# Patient Record
Sex: FEMALE | Race: OTHER | HISPANIC OR LATINO | Employment: OTHER | URBAN - METROPOLITAN AREA
[De-identification: names, ages, dates, MRNs, and addresses within clinical notes are randomized per-mention and may not be internally consistent; named-entity substitution may affect disease eponyms.]

---

## 2020-10-06 ENCOUNTER — HOSPITAL ENCOUNTER (EMERGENCY)
Facility: HOSPITAL | Age: 70
Discharge: HOME | End: 2020-10-06
Attending: EMERGENCY MEDICINE
Payer: MEDICARE

## 2020-10-06 VITALS
DIASTOLIC BLOOD PRESSURE: 74 MMHG | RESPIRATION RATE: 22 BRPM | SYSTOLIC BLOOD PRESSURE: 145 MMHG | HEART RATE: 83 BPM | BODY MASS INDEX: 28.52 KG/M2 | WEIGHT: 155 LBS | HEIGHT: 62 IN | TEMPERATURE: 98 F | OXYGEN SATURATION: 100 %

## 2020-10-06 DIAGNOSIS — E86.0 DEHYDRATION: ICD-10-CM

## 2020-10-06 DIAGNOSIS — E11.65 UNCONTROLLED TYPE 2 DIABETES MELLITUS WITH HYPERGLYCEMIA: Primary | ICD-10-CM

## 2020-10-06 DIAGNOSIS — R42 DIZZINESS: ICD-10-CM

## 2020-10-06 LAB
ALBUMIN SERPL BCP-MCNC: 3.8 G/DL (ref 3.5–5.2)
ALP SERPL-CCNC: 121 U/L (ref 55–135)
ALT SERPL W/O P-5'-P-CCNC: 19 U/L (ref 10–44)
ANION GAP SERPL CALC-SCNC: 12 MMOL/L (ref 8–16)
AST SERPL-CCNC: 16 U/L (ref 10–40)
BACTERIA #/AREA URNS HPF: ABNORMAL /HPF
BASOPHILS # BLD AUTO: 0.04 K/UL (ref 0–0.2)
BASOPHILS NFR BLD: 0.6 % (ref 0–1.9)
BILIRUB SERPL-MCNC: 0.4 MG/DL (ref 0.1–1)
BILIRUB UR QL STRIP: NEGATIVE
BUN SERPL-MCNC: 15 MG/DL (ref 8–23)
CALCIUM SERPL-MCNC: 8.5 MG/DL (ref 8.7–10.5)
CHLORIDE SERPL-SCNC: 98 MMOL/L (ref 95–110)
CLARITY UR: CLEAR
CO2 SERPL-SCNC: 25 MMOL/L (ref 23–29)
COLOR UR: ABNORMAL
CREAT SERPL-MCNC: 1.1 MG/DL (ref 0.5–1.4)
DIFFERENTIAL METHOD: ABNORMAL
EOSINOPHIL # BLD AUTO: 0.2 K/UL (ref 0–0.5)
EOSINOPHIL NFR BLD: 2.2 % (ref 0–8)
ERYTHROCYTE [DISTWIDTH] IN BLOOD BY AUTOMATED COUNT: 15.1 % (ref 11.5–14.5)
EST. GFR  (AFRICAN AMERICAN): 58 ML/MIN/1.73 M^2
EST. GFR  (NON AFRICAN AMERICAN): 50 ML/MIN/1.73 M^2
GLUCOSE SERPL-MCNC: 572 MG/DL (ref 70–110)
GLUCOSE UR QL STRIP: ABNORMAL
HCT VFR BLD AUTO: 38.8 % (ref 37–48.5)
HGB BLD-MCNC: 12.6 G/DL (ref 12–16)
HGB UR QL STRIP: ABNORMAL
HYALINE CASTS #/AREA URNS LPF: 0 /LPF
IMM GRANULOCYTES # BLD AUTO: 0.03 K/UL (ref 0–0.04)
IMM GRANULOCYTES NFR BLD AUTO: 0.4 % (ref 0–0.5)
KETONES UR QL STRIP: ABNORMAL
LEUKOCYTE ESTERASE UR QL STRIP: NEGATIVE
LYMPHOCYTES # BLD AUTO: 1.7 K/UL (ref 1–4.8)
LYMPHOCYTES NFR BLD: 22.8 % (ref 18–48)
MAGNESIUM SERPL-MCNC: 1.9 MG/DL (ref 1.6–2.6)
MCH RBC QN AUTO: 25.9 PG (ref 27–31)
MCHC RBC AUTO-ENTMCNC: 32.5 G/DL (ref 32–36)
MCV RBC AUTO: 80 FL (ref 82–98)
MICROSCOPIC COMMENT: ABNORMAL
MONOCYTES # BLD AUTO: 0.8 K/UL (ref 0.3–1)
MONOCYTES NFR BLD: 10.6 % (ref 4–15)
NEUTROPHILS # BLD AUTO: 4.6 K/UL (ref 1.8–7.7)
NEUTROPHILS NFR BLD: 63.4 % (ref 38–73)
NITRITE UR QL STRIP: NEGATIVE
NRBC BLD-RTO: 0 /100 WBC
PH UR STRIP: 6 [PH] (ref 5–8)
PLATELET # BLD AUTO: 222 K/UL (ref 150–350)
PMV BLD AUTO: 11.1 FL (ref 9.2–12.9)
POCT GLUCOSE: 284 MG/DL (ref 70–110)
POCT GLUCOSE: 376 MG/DL (ref 70–110)
POTASSIUM SERPL-SCNC: 3.7 MMOL/L (ref 3.5–5.1)
PROT SERPL-MCNC: 7 G/DL (ref 6–8.4)
PROT UR QL STRIP: ABNORMAL
RBC # BLD AUTO: 4.86 M/UL (ref 4–5.4)
RBC #/AREA URNS HPF: 5 /HPF (ref 0–4)
SODIUM SERPL-SCNC: 135 MMOL/L (ref 136–145)
SP GR UR STRIP: >1.03 (ref 1–1.03)
TROPONIN I SERPL DL<=0.01 NG/ML-MCNC: <0.006 NG/ML (ref 0–0.03)
TSH SERPL DL<=0.005 MIU/L-ACNC: 3.58 UIU/ML (ref 0.4–4)
URN SPEC COLLECT METH UR: ABNORMAL
UROBILINOGEN UR STRIP-ACNC: NEGATIVE EU/DL
WBC # BLD AUTO: 7.27 K/UL (ref 3.9–12.7)
WBC #/AREA URNS HPF: 5 /HPF (ref 0–5)
YEAST URNS QL MICRO: ABNORMAL

## 2020-10-06 PROCEDURE — 82962 GLUCOSE BLOOD TEST: CPT | Mod: 91

## 2020-10-06 PROCEDURE — 85025 COMPLETE CBC W/AUTO DIFF WBC: CPT

## 2020-10-06 PROCEDURE — 25000003 PHARM REV CODE 250: Performed by: EMERGENCY MEDICINE

## 2020-10-06 PROCEDURE — 93010 ELECTROCARDIOGRAM REPORT: CPT | Mod: ,,, | Performed by: INTERNAL MEDICINE

## 2020-10-06 PROCEDURE — 63600175 PHARM REV CODE 636 W HCPCS: Performed by: EMERGENCY MEDICINE

## 2020-10-06 PROCEDURE — 93010 EKG 12-LEAD: ICD-10-PCS | Mod: ,,, | Performed by: INTERNAL MEDICINE

## 2020-10-06 PROCEDURE — 81000 URINALYSIS NONAUTO W/SCOPE: CPT

## 2020-10-06 PROCEDURE — 84484 ASSAY OF TROPONIN QUANT: CPT

## 2020-10-06 PROCEDURE — 96374 THER/PROPH/DIAG INJ IV PUSH: CPT

## 2020-10-06 PROCEDURE — 83735 ASSAY OF MAGNESIUM: CPT

## 2020-10-06 PROCEDURE — 99284 EMERGENCY DEPT VISIT MOD MDM: CPT | Mod: 25

## 2020-10-06 PROCEDURE — 96361 HYDRATE IV INFUSION ADD-ON: CPT

## 2020-10-06 PROCEDURE — 84443 ASSAY THYROID STIM HORMONE: CPT

## 2020-10-06 PROCEDURE — 80053 COMPREHEN METABOLIC PANEL: CPT

## 2020-10-06 PROCEDURE — 93005 ELECTROCARDIOGRAM TRACING: CPT

## 2020-10-06 RX ORDER — METFORMIN HYDROCHLORIDE 500 MG/1
500 TABLET ORAL
Qty: 30 TABLET | Refills: 0 | OUTPATIENT
Start: 2020-10-06 | End: 2020-10-15

## 2020-10-06 RX ORDER — MECLIZINE HYDROCHLORIDE 25 MG/1
25 TABLET ORAL EVERY 6 HOURS PRN
Qty: 20 TABLET | Refills: 0 | Status: SHIPPED | OUTPATIENT
Start: 2020-10-06

## 2020-10-06 RX ORDER — ONDANSETRON 4 MG/1
4 TABLET, FILM COATED ORAL EVERY 8 HOURS PRN
Qty: 12 TABLET | Refills: 0 | Status: SHIPPED | OUTPATIENT
Start: 2020-10-06

## 2020-10-06 RX ORDER — MECLIZINE HYDROCHLORIDE 25 MG/1
25 TABLET ORAL
Status: COMPLETED | OUTPATIENT
Start: 2020-10-06 | End: 2020-10-06

## 2020-10-06 RX ADMIN — INSULIN HUMAN 7 UNITS: 100 INJECTION, SOLUTION PARENTERAL at 09:10

## 2020-10-06 RX ADMIN — SODIUM CHLORIDE 1000 ML: 0.9 INJECTION, SOLUTION INTRAVENOUS at 09:10

## 2020-10-06 RX ADMIN — MECLIZINE HYDROCHLORIDE 25 MG: 25 TABLET ORAL at 07:10

## 2020-10-07 NOTE — DISCHARGE INSTRUCTIONS
Please begin a diabetic diet.  No bread rice sugar pasta candy sweets cake potatoes.  Eat meat and vegetables with a small amount of fruit and rough week bread or crackers.  Metformin as directed.  See your primary care doctor this week.  Return immediately if you get worse or if new problems develop.  Lots of liquids without sugar.

## 2020-10-07 NOTE — ED NOTES
In POV. A/O X4. Ambulatory to room. VSS.Family at bedside.     Pt notes dizziness/N/V for 3 days. Pt also C/O L ear pain for same amount of time. Pt states dizziness is worse when changing positions from lying flat to sitting up. Pt ambulatory in straight line without difficulty. NIH exam noted to be normal. Pt notes decreased appetite and 20 lbs weight loss over last 2 months.     Hooked to monitor. VSS. Will continue to closely monitor.

## 2020-10-07 NOTE — ED PROVIDER NOTES
Encounter Date: 10/6/2020    SCRIBE #1 NOTE: I, Floyd Salazar, am scribing for, and in the presence of,  Renan Lombardo MD. I have scribed the following portions of the note - Other sections scribed: HPI, VIDYA.       History     Chief Complaint   Patient presents with    Fatigue     Patient c/o fatigue, generalized malaise, n/v, decreased appetite, and dizziness x 3 days.  Denies HA, urnary symptoms, diarrhea, or fever.    Vomiting    Dizziness     Thu Clements is an 70 y.o female presenting to the ED complaining of a sudden onset of generalized malaise and fatigue persisting for three to four days. Patient reports symptoms of dizziness persisting for one week, nausea, emesis, weakness, decreased appetite, and unexpected weight loss of 20 pounds within two to three months. Patient endorses otalgia of the left ear. Patient denies fever, chills, diaphoresis, headache, eye problems, sore throat, chest pain, cough, shortness of breath, diarrhea, dysuria, arm and leg problems, or rash. Patient reports spinning dizziness presents when waking up in the morning and worsens when standing up or sitting down. Patient is currently on Lisinopril medication. Daughter reports a past medical history of myocardial infarction, coronary artery disease, and thyroid cancer. Daughter denies a past medical history of stroke. Patient reports a past surgical history of cardiac surgery and thyroidectomy. No known drug allergies. No tobacco, EtOH, or street drug abuse.    The history is provided by the patient and a relative.     Review of patient's allergies indicates:  No Known Allergies  Past Medical History:   Diagnosis Date    Coronary artery disease     Hypertension      Past Surgical History:   Procedure Laterality Date    CARDIAC SURGERY      THYROIDECTOMY       Family History   Problem Relation Age of Onset    Diabetes Mother     Heart disease Mother      Social History     Tobacco Use    Smoking status: Never Smoker    Substance Use Topics    Alcohol use: No    Drug use: No     Review of Systems   Constitutional: Positive for appetite change, fatigue and unexpected weight change. Negative for chills, diaphoresis and fever.   HENT: Positive for ear pain. Negative for sore throat.    Eyes: Negative for pain, discharge, redness and itching.   Respiratory: Negative for cough and shortness of breath.    Cardiovascular: Negative for chest pain.   Gastrointestinal: Positive for nausea and vomiting. Negative for abdominal pain and diarrhea.   Genitourinary: Negative for dysuria.   Musculoskeletal: Negative for myalgias.   Skin: Negative for rash.   Neurological: Positive for dizziness and weakness. Negative for headaches.       Physical Exam     Initial Vitals [10/06/20 1922]   BP Pulse Resp Temp SpO2   (!) 159/76 79 20 98 °F (36.7 °C) 97 %      MAP       --         Physical Exam  The patient was examined specifically for the following:   General:No significant distress, Good color, Warm and dry. Head and neck:Scalp atraumatic, Neck supple. Neurological:Appropriate conversation, Gross motor deficits. Eyes:Conjugate gaze, Clear corneas. ENT: No epistaxis. Cardiac: Regular rate and rhythm, Grossly normal heart tones. Pulmonary: Wheezing, Rales. Gastrointestinal: Abdominal tenderness, Abdominal distention. Musculoskeletal: Extremity deformity, Apparent pain with range of motion of the joints. Skin: Rash.   The findings on examination were normal except for the following:  The left tympanic membrane is clear.  The neck is supple.  Mental status examination, cranial nerves, motor and sensory examination are normal.  Finger-to-nose is normal.  The patient has a normal gait.  The dizziness can be exacerbated by moving the patient from supine to sitting and sitting to supine.  The lungs are clear.  The heart tones are normal.  The abdomen is soft.  Vital signs are stable.  ED Course   Procedures  Labs Reviewed   CBC W/ AUTO DIFFERENTIAL -  Abnormal; Notable for the following components:       Result Value    Mean Corpuscular Volume 80 (*)     Mean Corpuscular Hemoglobin 25.9 (*)     RDW 15.1 (*)     All other components within normal limits   COMPREHENSIVE METABOLIC PANEL - Abnormal; Notable for the following components:    Sodium 135 (*)     Glucose 572 (*)     Calcium 8.5 (*)     eGFR if  58 (*)     eGFR if non  50 (*)     All other components within normal limits    Narrative:     Glucose critical result(s) called and verbal readback obtained from   Jewels Irizarry  by [facility] 10/06/2020 20:36   URINALYSIS, REFLEX TO URINE CULTURE - Abnormal; Notable for the following components:    Specific Gravity, UA >1.030 (*)     Protein, UA 1+ (*)     Glucose, UA 3+ (*)     Ketones, UA 1+ (*)     Occult Blood UA 1+ (*)     All other components within normal limits    Narrative:     Specimen Source->Urine   URINALYSIS MICROSCOPIC - Abnormal; Notable for the following components:    RBC, UA 5 (*)     All other components within normal limits    Narrative:     Specimen Source->Urine   POCT GLUCOSE - Abnormal; Notable for the following components:    POCT Glucose 376 (*)     All other components within normal limits   MAGNESIUM   TSH   TROPONIN I   POCT GLUCOSE MONITORING CONTINUOUS     EKG Readings: (Independently Interpreted)   This patient is in a normal sinus rhythm with a heart rate of 80.  There are no significant ST segment or T-wave changes.  There is no evidence of acute myocardial infarction or malignant arrhythmia.       Imaging Results          CT Head Without Contrast (Final result)  Result time 10/06/20 20:28:28    Final result by Shane Lubin MD (10/06/20 20:28:28)                 Impression:      No acute intracranial abnormalities identified.      Electronically signed by: Shane Lubin MD  Date:    10/06/2020  Time:    20:28             Narrative:    EXAMINATION:  CT HEAD WITHOUT CONTRAST    CLINICAL  HISTORY:  Dizziness, persistent/recurrent, cardiac or vascular cause suspected; Dizziness and giddiness    TECHNIQUE:  Low dose axial images were obtained through the head.  Coronal and sagittal reformations were also performed. Contrast was not administered.    COMPARISON:  None.    FINDINGS:  There is mild chronic microvascular ischemic change.  No evidence of acute/recent major vascular distribution cerebral infarction, intraparenchymal hemorrhage, or intra-axial space occupying lesion. The ventricular system is normal in size and configuration with no evidence of hydrocephalus. No effacement of the skull-base cisterns. No abnormal extra-axial fluid collections or blood products. Visualized paranasal sinuses and mastoid air cells are clear. The calvarium shows no significant abnormality.                              Medical decision making:  Given the above, this patient presents to the emergency room with dizziness.  This is a sensation of disequilibrium.  CT of the head is negative for ischemic stroke despite for 5 days of symptoms.  Patient is discovered to have a blood glucose of 572.  Near examination is normal.  This patient may have peripheral vertigo.  I doubt diabetic ketoacidosis.  Patient has a normal anion gap and a normal CO2.  I will treat with metformin and meclizine.                Scribe Attestation:   Scribe #1: I performed the above scribed service and the documentation accurately describes the services I performed. I attest to the accuracy of the note.                      Clinical Impression:     ICD-10-CM ICD-9-CM   1. Uncontrolled type 2 diabetes mellitus with hyperglycemia  E11.65 250.02   2. Dizziness  R42 780.4   3. Dehydration  E86.0 276.51                          ED Disposition Condition    Discharge Stable        ED Prescriptions     Medication Sig Dispense Start Date End Date Auth. Provider    metFORMIN (GLUCOPHAGE) 500 MG tablet Take 1 tablet (500 mg total) by mouth daily with  breakfast. 30 tablet 10/6/2020 10/6/2021 Renan Lombardo MD    meclizine (ANTIVERT) 25 mg tablet Take 1 tablet (25 mg total) by mouth every 6 (six) hours as needed. 20 tablet 10/6/2020  Renan Lombardo MD    ondansetron (ZOFRAN) 4 MG tablet Take 1 tablet (4 mg total) by mouth every 8 (eight) hours as needed (Nausea and vomiting). 12 tablet 10/6/2020  Renan Lombardo MD        Follow-up Information     Follow up With Specialties Details Why Contact Info    Angelia Canales MD Internal Medicine, Pediatrics In 3 days  [address] [place] [id number] [phone]                            I personally performed the services described in this documentation.  All medical record  entries made by the scribe are at my direction and in my presence.  Signed, Dr. Grupo Lombardo MD  10/06/20 8016

## 2020-10-07 NOTE — ED NOTES
A/O X4. No S/S of  Distress. No current C/O pain. VSS. Family at bedside. IV D/C'd without difficulty. Verbalizes understanding of med/sinstrucitons/RX.

## 2020-10-09 ENCOUNTER — HOSPITAL ENCOUNTER (EMERGENCY)
Facility: HOSPITAL | Age: 70
Discharge: HOME | End: 2020-10-09
Attending: EMERGENCY MEDICINE
Payer: MEDICARE

## 2020-10-09 VITALS
RESPIRATION RATE: 20 BRPM | OXYGEN SATURATION: 99 % | BODY MASS INDEX: 28.52 KG/M2 | HEART RATE: 83 BPM | WEIGHT: 155 LBS | SYSTOLIC BLOOD PRESSURE: 133 MMHG | DIASTOLIC BLOOD PRESSURE: 65 MMHG | TEMPERATURE: 98 F | HEIGHT: 62 IN

## 2020-10-09 DIAGNOSIS — R73.9 HYPERGLYCEMIA: ICD-10-CM

## 2020-10-09 DIAGNOSIS — E11.65 UNCONTROLLED TYPE 2 DIABETES MELLITUS WITH HYPERGLYCEMIA: Primary | ICD-10-CM

## 2020-10-09 LAB
ALBUMIN SERPL BCP-MCNC: 3.7 G/DL (ref 3.5–5.2)
ALP SERPL-CCNC: 95 U/L (ref 55–135)
ALT SERPL W/O P-5'-P-CCNC: 16 U/L (ref 10–44)
ANION GAP SERPL CALC-SCNC: 13 MMOL/L (ref 8–16)
AST SERPL-CCNC: 17 U/L (ref 10–40)
B-OH-BUTYR BLD STRIP-SCNC: 2.2 MMOL/L (ref 0–0.5)
BACTERIA #/AREA URNS HPF: NORMAL /HPF
BASOPHILS # BLD AUTO: 0.05 K/UL (ref 0–0.2)
BASOPHILS NFR BLD: 0.8 % (ref 0–1.9)
BILIRUB SERPL-MCNC: 0.6 MG/DL (ref 0.1–1)
BILIRUB UR QL STRIP: NEGATIVE
BUN SERPL-MCNC: 16 MG/DL (ref 8–23)
CALCIUM SERPL-MCNC: 8.7 MG/DL (ref 8.7–10.5)
CHLORIDE SERPL-SCNC: 100 MMOL/L (ref 95–110)
CK SERPL-CCNC: 44 U/L (ref 20–180)
CLARITY UR: CLEAR
CO2 SERPL-SCNC: 22 MMOL/L (ref 23–29)
COLOR UR: ABNORMAL
CREAT SERPL-MCNC: 0.8 MG/DL (ref 0.5–1.4)
CTP QC/QA: YES
DIFFERENTIAL METHOD: ABNORMAL
EOSINOPHIL # BLD AUTO: 0.1 K/UL (ref 0–0.5)
EOSINOPHIL NFR BLD: 2 % (ref 0–8)
ERYTHROCYTE [DISTWIDTH] IN BLOOD BY AUTOMATED COUNT: 15.9 % (ref 11.5–14.5)
EST. GFR  (AFRICAN AMERICAN): >60 ML/MIN/1.73 M^2
EST. GFR  (NON AFRICAN AMERICAN): >60 ML/MIN/1.73 M^2
ESTIMATED AVG GLUCOSE: ABNORMAL MG/DL (ref 68–131)
GLUCOSE SERPL-MCNC: 281 MG/DL (ref 70–110)
GLUCOSE SERPL-MCNC: 421 MG/DL (ref 70–110)
GLUCOSE UR QL STRIP: ABNORMAL
HBA1C MFR BLD HPLC: >14 % (ref 4–5.6)
HCT VFR BLD AUTO: 39.4 % (ref 37–48.5)
HGB BLD-MCNC: 12.9 G/DL (ref 12–16)
HGB UR QL STRIP: ABNORMAL
IMM GRANULOCYTES # BLD AUTO: 0.03 K/UL (ref 0–0.04)
IMM GRANULOCYTES NFR BLD AUTO: 0.5 % (ref 0–0.5)
KETONES UR QL STRIP: ABNORMAL
LEUKOCYTE ESTERASE UR QL STRIP: NEGATIVE
LIPASE SERPL-CCNC: 82 U/L (ref 4–60)
LYMPHOCYTES # BLD AUTO: 1.1 K/UL (ref 1–4.8)
LYMPHOCYTES NFR BLD: 16.8 % (ref 18–48)
MAGNESIUM SERPL-MCNC: 1.8 MG/DL (ref 1.6–2.6)
MCH RBC QN AUTO: 26.9 PG (ref 27–31)
MCHC RBC AUTO-ENTMCNC: 32.7 G/DL (ref 32–36)
MCV RBC AUTO: 82 FL (ref 82–98)
MICROSCOPIC COMMENT: NORMAL
MONOCYTES # BLD AUTO: 0.6 K/UL (ref 0.3–1)
MONOCYTES NFR BLD: 9.3 % (ref 4–15)
NEUTROPHILS # BLD AUTO: 4.5 K/UL (ref 1.8–7.7)
NEUTROPHILS NFR BLD: 70.6 % (ref 38–73)
NITRITE UR QL STRIP: NEGATIVE
NRBC BLD-RTO: 0 /100 WBC
PH UR STRIP: 6 [PH] (ref 5–8)
PLATELET # BLD AUTO: 211 K/UL (ref 150–350)
PMV BLD AUTO: 11.3 FL (ref 9.2–12.9)
POCT GLUCOSE: 221 MG/DL (ref 70–110)
POCT GLUCOSE: 281 MG/DL (ref 70–110)
POCT GLUCOSE: 376 MG/DL (ref 70–110)
POTASSIUM SERPL-SCNC: 4 MMOL/L (ref 3.5–5.1)
PROT SERPL-MCNC: 6.9 G/DL (ref 6–8.4)
PROT UR QL STRIP: NEGATIVE
RBC # BLD AUTO: 4.8 M/UL (ref 4–5.4)
RBC #/AREA URNS HPF: 1 /HPF (ref 0–4)
SARS-COV-2 RDRP RESP QL NAA+PROBE: NEGATIVE
SODIUM SERPL-SCNC: 135 MMOL/L (ref 136–145)
SP GR UR STRIP: 1.01 (ref 1–1.03)
SQUAMOUS #/AREA URNS HPF: 4 /HPF
TROPONIN I SERPL DL<=0.01 NG/ML-MCNC: 0.01 NG/ML (ref 0–0.03)
URN SPEC COLLECT METH UR: ABNORMAL
UROBILINOGEN UR STRIP-ACNC: NEGATIVE EU/DL
WBC # BLD AUTO: 6.42 K/UL (ref 3.9–12.7)
WBC #/AREA URNS HPF: 1 /HPF (ref 0–5)
YEAST URNS QL MICRO: NORMAL

## 2020-10-09 PROCEDURE — 84484 ASSAY OF TROPONIN QUANT: CPT

## 2020-10-09 PROCEDURE — U0002 COVID-19 LAB TEST NON-CDC: HCPCS | Performed by: EMERGENCY MEDICINE

## 2020-10-09 PROCEDURE — 82550 ASSAY OF CK (CPK): CPT

## 2020-10-09 PROCEDURE — 93010 EKG 12-LEAD: ICD-10-PCS | Mod: ,,, | Performed by: INTERNAL MEDICINE

## 2020-10-09 PROCEDURE — 99285 EMERGENCY DEPT VISIT HI MDM: CPT | Mod: 25

## 2020-10-09 PROCEDURE — 83036 HEMOGLOBIN GLYCOSYLATED A1C: CPT

## 2020-10-09 PROCEDURE — 93005 ELECTROCARDIOGRAM TRACING: CPT

## 2020-10-09 PROCEDURE — 93010 ELECTROCARDIOGRAM REPORT: CPT | Mod: ,,, | Performed by: INTERNAL MEDICINE

## 2020-10-09 PROCEDURE — 80053 COMPREHEN METABOLIC PANEL: CPT

## 2020-10-09 PROCEDURE — 81000 URINALYSIS NONAUTO W/SCOPE: CPT

## 2020-10-09 PROCEDURE — 25000003 PHARM REV CODE 250: Performed by: EMERGENCY MEDICINE

## 2020-10-09 PROCEDURE — 82962 GLUCOSE BLOOD TEST: CPT | Mod: 91

## 2020-10-09 PROCEDURE — 83690 ASSAY OF LIPASE: CPT

## 2020-10-09 PROCEDURE — 82010 KETONE BODYS QUAN: CPT

## 2020-10-09 PROCEDURE — 96360 HYDRATION IV INFUSION INIT: CPT

## 2020-10-09 PROCEDURE — 85025 COMPLETE CBC W/AUTO DIFF WBC: CPT

## 2020-10-09 PROCEDURE — 96361 HYDRATE IV INFUSION ADD-ON: CPT

## 2020-10-09 PROCEDURE — 83735 ASSAY OF MAGNESIUM: CPT

## 2020-10-09 RX ORDER — SODIUM CHLORIDE 9 MG/ML
1000 INJECTION, SOLUTION INTRAVENOUS
Status: COMPLETED | OUTPATIENT
Start: 2020-10-09 | End: 2020-10-09

## 2020-10-09 RX ADMIN — SODIUM CHLORIDE 1000 ML: 0.9 INJECTION, SOLUTION INTRAVENOUS at 08:10

## 2020-10-09 RX ADMIN — SODIUM CHLORIDE 1000 ML: 0.9 INJECTION, SOLUTION INTRAVENOUS at 11:10

## 2020-10-09 NOTE — ED PROVIDER NOTES
"Encounter Date: 10/9/2020    SCRIBE #1 NOTE: I, Sherrykatya Henry, am scribing for, and in the presence of,  Sumanth Castillo MD. I have scribed the following portions of the note - Other sections scribed: HPI, ROS, PE, Differential.       History     Chief Complaint   Patient presents with    Hyperglycemia     Pt reports her sugar has been high at home and she feels bad. Seen in this ED on 10/6/2020 for same complaint. Pt c/o generalized weakness, "feeling dehydrated". Pain is 4/10 (body aches). Glucose 376 in triage     CC: Elevated Blood Sugar    HPI: This 70 y.o female, with a medical history of coronary artery disease, diabetes mellitus, hypertension, and thyroid disease, presents to the ED accompanied by a relative c/o an elevated blood sugar (480 at home). Pt reports feeling weak, dehydrated and nauseated, adding that she has also been experiencing mouth dryness (at night), myalgias (for the last 15 days) and unintentional weight loss (20 lbs over the last 3 months). The symptoms are acute, constant and severe (8/10). Of note, pt reports that she has been taking Metformin, which she was prescribed after being diagnosed with diabetes during a recent ED visit. Pt denies fever, emesis, diarrhea, abdominal pain or dysuria. No other associated symptoms. No alleviating factors.    The history is provided by the patient.     Review of patient's allergies indicates:  No Known Allergies  Past Medical History:   Diagnosis Date    Coronary artery disease     Diabetes mellitus     Hyperlipidemia     Hypertension     Thyroid disease      Past Surgical History:   Procedure Laterality Date    CARDIAC SURGERY      THYROIDECTOMY       Family History   Problem Relation Age of Onset    Diabetes Mother     Heart disease Mother      Social History     Tobacco Use    Smoking status: Never Smoker    Smokeless tobacco: Never Used   Substance Use Topics    Alcohol use: No    Drug use: No     Review of Systems "   Constitutional: Positive for unexpected weight change (weight loss-20 lbs over 3 months). Negative for chills and fever.        (+) elevated blood sugar; (+) feels dehydrated   HENT: Negative for congestion, rhinorrhea and sore throat.         (+) dry mouth   Eyes: Negative for visual disturbance.   Respiratory: Negative for cough and shortness of breath.    Cardiovascular: Negative for chest pain.   Gastrointestinal: Positive for nausea. Negative for abdominal pain, diarrhea and vomiting.   Genitourinary: Negative for dysuria.   Musculoskeletal: Positive for myalgias. Negative for back pain.   Skin: Negative for rash.   Neurological: Positive for weakness. Negative for dizziness and headaches.       Physical Exam     Initial Vitals [10/09/20 0816]   BP Pulse Resp Temp SpO2   (!) 148/74 104 18 98.1 °F (36.7 °C) 98 %      MAP       --         Physical Exam    Nursing note and vitals reviewed.  Constitutional: She is not diaphoretic. No distress.   HENT:   Head: Normocephalic and atraumatic.   Mouth/Throat: Oropharynx is clear and moist.   Eyes: EOM are normal. Pupils are equal, round, and reactive to light. No scleral icterus.   Neck: Normal range of motion. Neck supple. No JVD present.   Cardiovascular: Regular rhythm and intact distal pulses.   Tachycardic   Pulmonary/Chest: Breath sounds normal. No stridor. No respiratory distress.   Abdominal: Soft. Bowel sounds are normal. She exhibits no distension and no mass. There is no abdominal tenderness. There is no rebound and no guarding.   Musculoskeletal: Normal range of motion. No tenderness or edema.   Neurological: She is alert. She has normal strength. No cranial nerve deficit or sensory deficit. GCS score is 15. GCS eye subscore is 4. GCS verbal subscore is 5. GCS motor subscore is 6.   Skin: Skin is warm and dry.   Psychiatric: She has a normal mood and affect.         ED Course   Procedures  Labs Reviewed   CBC W/ AUTO DIFFERENTIAL - Abnormal; Notable for  the following components:       Result Value    Mean Corpuscular Hemoglobin 26.9 (*)     RDW 15.9 (*)     Lymph% 16.8 (*)     All other components within normal limits   COMPREHENSIVE METABOLIC PANEL - Abnormal; Notable for the following components:    Sodium 135 (*)     CO2 22 (*)     Glucose 421 (*)     All other components within normal limits   BETA - HYDROXYBUTYRATE, SERUM - Abnormal; Notable for the following components:    Beta-Hydroxybutyrate 2.2 (*)     All other components within normal limits   URINALYSIS, REFLEX TO URINE CULTURE - Abnormal; Notable for the following components:    Glucose, UA 3+ (*)     Ketones, UA 1+ (*)     Occult Blood UA 1+ (*)     All other components within normal limits    Narrative:     Specimen Source->Urine   HEMOGLOBIN A1C - Abnormal; Notable for the following components:    Hemoglobin A1C >14.0 (*)     All other components within normal limits   LIPASE - Abnormal; Notable for the following components:    Lipase 82 (*)     All other components within normal limits   POCT GLUCOSE - Abnormal; Notable for the following components:    POCT Glucose 376 (*)     All other components within normal limits   POCT GLUCOSE MONITORING CONTINUOUS - Abnormal; Notable for the following components:    POC Glucose 281 (*)     All other components within normal limits   POCT GLUCOSE - Abnormal; Notable for the following components:    POCT Glucose 281 (*)     All other components within normal limits   POCT GLUCOSE - Abnormal; Notable for the following components:    POCT Glucose 221 (*)     All other components within normal limits   TROPONIN I   CK   MAGNESIUM   URINALYSIS MICROSCOPIC    Narrative:     Specimen Source->Urine   SARS-COV-2 RDRP GENE     EKG Readings: (Independently Interpreted)   Initial Reading: No STEMI. Rhythm: Normal Sinus Rhythm. Heart Rate: 98. Ectopy: No Ectopy. Conduction: Normal. ST Segments: Normal ST Segments. T Waves Flipped: I, AVL and V2.       Imaging Results            CT Abdomen Pelvis  Without Contrast (Final result)  Result time 10/09/20 10:01:14    Final result by Darell Mandujano MD (10/09/20 10:01:14)                 Impression:      Abnormal gastric wall thickening along its anterior fundal and greater curvature portion.  While this could represent gastritis, gastric neoplasm is difficult to exclude.  Consider EGD.    Atherosclerotic vascular disease.    This report was flagged in Epic as abnormal.      Electronically signed by: Darell Mandujano  Date:    10/09/2020  Time:    10:01             Narrative:    EXAMINATION:  CT ABDOMEN PELVIS WITHOUT CONTRAST    CLINICAL HISTORY:  Abdominal pain, weight loss;    TECHNIQUE:  Low dose axial images, sagittal and coronal reformations were obtained from the lung bases to the pubic symphysis.    COMPARISON:  None    FINDINGS:  The lung bases are clear with mild ground-glass opacity in the posterior right lung base but without evidence of consolidation or nodule.    The body and fundus of the stomach appear thickened specially along the greater curvature.  The remainder of the GI tract appears unremarkable without evidence zone of transition or wall thickening.  The appendix is not identified.    The liver, spleen, kidneys and pancreas appear unremarkable allowing for the lack of IV contrast.  Gallbladder appears partially distended with no inflammatory features.    Atherosclerotic vascular calcifications throughout the aorta and iliac system without aneurysmal dilatation is seen.  There is no retroperitoneal mass or lymph node enlargement.    The mesentery appears unremarkable.  There is no evidence of ascites or lymph node enlargement.    Within the pelvis, the uterus appears absent.  The urinary bladder appears unremarkable.  Contrast material appears to be within the sigmoid colon and rectum.                               X-Ray Chest AP Portable (Final result)  Result time 10/09/20 09:24:30    Final result by Josias LEVIN  MD Nadja (10/09/20 09:24:30)                 Impression:      No evidence of acute cardiopulmonary disease.      Electronically signed by: Josias Saez  Date:    10/09/2020  Time:    09:24             Narrative:    EXAMINATION:  XR CHEST AP PORTABLE    CLINICAL HISTORY:  hyperglycemia;    TECHNIQUE:  Single frontal view of the chest was performed.    COMPARISON:  Chest radiograph performed 10/05/2013    FINDINGS:  EKG leads overlie the chest.  Status post median sternotomy present for coronary artery bypass grafting.  Prostatic valves are noted.  Cardiomediastinal contour appears unchanged.  Surgical clips are noted in the lower neck.  Similar appearance of prominent interstitial markings.  No new airspace opacities are noted.  No convincing evidence of pneumothorax or pleural effusion.  No acute findings are suggested in the visualized upper abdomen.  Osseous mineralization is noted.  Patchy sclerosis in the proximal left humerus, finding is seen dating back to at least the 10/05/2013 radiograph.  Given long-term stability, this is of doubtful clinical significance.  Differential considerations would include prior bone infarct versus a chondroid lesion.  No associated pathologic fracture.  Degenerative findings at the acromioclavicular joints.                                 Medical Decision Making:   History:   Old Medical Records: I decided to obtain old medical records.  Old Records Summarized: other records.       <> Summary of Records: Patient was seen in the ED on 10/6/2020 for uncontrolled type 2 diabetes mellitus with hyperglycemia.   Differential Diagnosis:   Differential diagnosis includes hyperglycemia, dehydration, renal failure, rhabdomyolysis and ACS.  Independently Interpreted Test(s):   I have ordered and independently interpreted EKG Reading(s) - see prior notes  Clinical Tests:   Lab Tests: Ordered and Reviewed  Radiological Study: Ordered and Reviewed  Medical Tests: Ordered and Reviewed  ED  Management:  Patient is afebrile and not toxic appearing at time history and physical.  She is hyperglycemic and mildly tachycardic.  EKG is normal sinus rhythm.  EKG reports significantly prolonged QT.  My independent calculation of patient's QTc is in the 470s-480s.  She has not had episodes of significant arrhythmia in the emergency department.  She is not tachypneic and has a benign abdominal exam.  Labs without leukocytosis or significant electrolyte abnormality.  Patient has normal renal function and no anion gap however beta hydroxybutyrate is elevated.  CPK and troponin are within normal ranges.  CT of the abdomen significant for is thickened stomach concerning for possible gastritis versus neoplasm.  Patient and daughter informed of findings of CT scan informed the importance of close follow-up with primary physician as well as GI.  Patient given IV hydration in the emergency department with significant improvement in blood glucose.  On reassessment she reports significant improvement in symptoms.  She reports feeling well for discharge.  Patient and daughter counseled extensively on compliance with a diabetic diet, monitoring of blood glucose and the importance of follow-up with endocrinology. counseled on supportive care, appropriate medication usage, concerning symptoms for which to return to ER and the importance of follow up. Understanding and agreement with treatment plan was expressed.   This chart was completed using dictation software, as a result there may be some transcription errors.             Scribe Attestation:   Scribe #1: I performed the above scribed service and the documentation accurately describes the services I performed. I attest to the accuracy of the note.                      Clinical Impression:     ICD-10-CM ICD-9-CM   1. Uncontrolled type 2 diabetes mellitus with hyperglycemia  E11.65 250.02   2. Hyperglycemia  R73.9 790.29                      Disposition:   Disposition:  Discharged  Condition: Stable     ED Disposition Condition    Discharge Stable        ED Prescriptions     None        Follow-up Information     Follow up With Specialties Details Why Contact Info    [facility]-All Locations Gastroenterology Schedule an appointment as soon as possible for a visit   [address] [place] [address]  [address]  [place] [address]  [phone]      Your Primary Physician  Schedule an appointment as soon as possible for a visit   Make an appointment to see your primary care physician as soon as possible for follow-up    endocrinology    from the list provided                        I, Sumanth Castillo , personally performed the services described in this documentation. All medical record entries made by the scribe were at my direction and in my presence. I have reviewed the chart and agree that the record reflects my personal performance and is accurate and complete.               Sumanth Castillo MD  10/10/20 1843       Sumanth Castillo MD  10/10/20 1449

## 2020-10-09 NOTE — ED TRIAGE NOTES
Pt comes in via POV from home with c/o muscle pain and weakness. States she was seen here 2 days ago for the same and told she had diabetes -- which is a new diagnosis for her. She states she has not made f/u appt with pcp regarding this.

## 2020-10-09 NOTE — ED TRIAGE NOTES
"Pt reports her sugar has been high at home and she feels bad. Seen in this ED on 10/6/2020 for same complaint. Pt c/o generalized weakness, "feeling dehydrated". Pain is 8/10 (body aches). Glucose 376 in triage. Denies taking meds for symptoms PTA  "

## 2020-10-09 NOTE — DISCHARGE INSTRUCTIONS
It is very important that you drink adequate fluids to remain hydrated.  Comply with a diabetic diet.  Monitor your blood glucose and keep a blood sugar log.  Continue the metformin you have been prescribed in addition to your other medications.  Be sure to schedule close follow-up with a primary care physician as well as Endocrinology.  Return to the emergency department for fever, chest pain, breathing difficulty, new numbness, new weakness, uncontrollable nausea or vomiting or any new, worsening or significantly concerning symptoms.  CT scan showed some thickening of your stomach that is of unclear cause.  It is important that you follow-up with Gastroenterology to further evaluate this finding on CT scan and evaluate for malignancy.    Thank you for coming to our Emergency Department today. It is important to remember that some problems are difficult to diagnose and may not be found during your first visit. Be sure to follow up with your primary care doctor and review any labs/imaging that was performed with them. If you do not have a primary care doctor, you may contact the one listed on your discharge paperwork or you may also call the [facility] Appointment Desk at [phone] to schedule an appointment with one.     All medications may potentially have side effects and it is impossible to predict which medications may give you side effects. If you feel that you are having a negative effect of any medication you should immediately stop taking them and seek medical attention.    Return to the ER with any questions/concerns, new/concerning symptoms, worsening or failure to improve. Do not drive or make any important decisions for 24 hours if you have received any pain medications, sedatives or mood altering drugs during your ER visit.

## 2020-10-09 NOTE — CONSULTS
Patient is a diabetic and stated that she needs a provider who accepts [employer]  Patient currently seeing an Endocrinologist at [facility].      ROWENA provided patient with a list of endocrinologist obtained from [facility]'s website.  ROWENA explained to patient and daughter that if patient's current provider is not on [facility]'s list, when she visits the office, she will be charged an out of network fee.    Daughter stated that she thinks patient sees Dr. Koch at [facility].    ROWENA provided patient with contact number for [facility]  Member Services [phone] to confirm her insurance coverage.    Patient current demographics showing traditional Medicare A[employer]B and [employer].    Consult completed.

## 2020-10-15 ENCOUNTER — HOSPITAL ENCOUNTER (EMERGENCY)
Facility: HOSPITAL | Age: 70
Discharge: HOME | End: 2020-10-15
Attending: EMERGENCY MEDICINE
Payer: MEDICARE

## 2020-10-15 VITALS
HEART RATE: 83 BPM | WEIGHT: 125 LBS | OXYGEN SATURATION: 100 % | DIASTOLIC BLOOD PRESSURE: 76 MMHG | HEIGHT: 62 IN | TEMPERATURE: 98 F | BODY MASS INDEX: 23 KG/M2 | SYSTOLIC BLOOD PRESSURE: 131 MMHG | RESPIRATION RATE: 16 BRPM

## 2020-10-15 DIAGNOSIS — E11.65 UNCONTROLLED TYPE 2 DIABETES MELLITUS WITH HYPERGLYCEMIA: Primary | ICD-10-CM

## 2020-10-15 LAB
ALBUMIN SERPL BCP-MCNC: 3.9 G/DL (ref 3.5–5.2)
ALP SERPL-CCNC: 114 U/L (ref 55–135)
ALT SERPL W/O P-5'-P-CCNC: 20 U/L (ref 10–44)
ANION GAP SERPL CALC-SCNC: 11 MMOL/L (ref 8–16)
AST SERPL-CCNC: 17 U/L (ref 10–40)
BILIRUB SERPL-MCNC: 0.4 MG/DL (ref 0.1–1)
BUN SERPL-MCNC: 16 MG/DL (ref 8–23)
CALCIUM SERPL-MCNC: 9.1 MG/DL (ref 8.7–10.5)
CHLORIDE SERPL-SCNC: 97 MMOL/L (ref 95–110)
CO2 SERPL-SCNC: 26 MMOL/L (ref 23–29)
CREAT SERPL-MCNC: 0.8 MG/DL (ref 0.5–1.4)
EST. GFR  (AFRICAN AMERICAN): >60 ML/MIN/1.73 M^2
EST. GFR  (NON AFRICAN AMERICAN): >60 ML/MIN/1.73 M^2
GLUCOSE SERPL-MCNC: 513 MG/DL (ref 70–110)
POCT GLUCOSE: 274 MG/DL (ref 70–110)
POCT GLUCOSE: 314 MG/DL (ref 70–110)
POCT GLUCOSE: 446 MG/DL (ref 70–110)
POTASSIUM SERPL-SCNC: 4.2 MMOL/L (ref 3.5–5.1)
PROT SERPL-MCNC: 7.4 G/DL (ref 6–8.4)
SODIUM SERPL-SCNC: 134 MMOL/L (ref 136–145)

## 2020-10-15 PROCEDURE — 99284 EMERGENCY DEPT VISIT MOD MDM: CPT | Mod: 25

## 2020-10-15 PROCEDURE — 96361 HYDRATE IV INFUSION ADD-ON: CPT

## 2020-10-15 PROCEDURE — 80053 COMPREHEN METABOLIC PANEL: CPT

## 2020-10-15 PROCEDURE — 25000003 PHARM REV CODE 250: Performed by: EMERGENCY MEDICINE

## 2020-10-15 PROCEDURE — 96374 THER/PROPH/DIAG INJ IV PUSH: CPT

## 2020-10-15 PROCEDURE — 63600175 PHARM REV CODE 636 W HCPCS: Performed by: EMERGENCY MEDICINE

## 2020-10-15 PROCEDURE — 82962 GLUCOSE BLOOD TEST: CPT | Mod: 91

## 2020-10-15 RX ORDER — METFORMIN HYDROCHLORIDE 1000 MG/1
1000 TABLET ORAL 2 TIMES DAILY
Qty: 60 TABLET | Refills: 3 | Status: SHIPPED | OUTPATIENT
Start: 2020-10-15 | End: 2021-10-15

## 2020-10-15 RX ADMIN — SODIUM CHLORIDE 1000 ML: 0.9 INJECTION, SOLUTION INTRAVENOUS at 04:10

## 2020-10-15 RX ADMIN — INSULIN HUMAN 10 UNITS: 100 INJECTION, SOLUTION PARENTERAL at 03:10

## 2020-10-15 RX ADMIN — SODIUM CHLORIDE 1000 ML: 0.9 INJECTION, SOLUTION INTRAVENOUS at 03:10

## 2020-10-15 NOTE — ED PROVIDER NOTES
Encounter Date: 10/15/2020    SCRIBE #1 NOTE: I, Myranda Florian, am scribing for, and in the presence of,  Renan Lombardo MD. I have scribed the following portions of the note - Other sections scribed: HPI, ROS.       History     Chief Complaint   Patient presents with    Hyperglycemia     Pt c/o weakness, dizzines, feeling ill, increased thirst,  Pt presenting with high blood sugar pt was seen in the ER for same complaint 3 different times and feels her medication is not working. PT denies any other complaint.      This is a 70 y.o. female with a PMHx of DM who presents to the ED with high blood sugar. She reports that she has been to the ED for this 3 times. She states that she checked her blood sugar this morning and it was 430. She notes that she has been taking Metformin once per day before breakfast, but she doesn't think it is working. She denies using Insulin and states that she just has a healthy diet. She reports that she has an appointment with a PCP at [facility] on 11/10/20. She denies taking any steroids. Denies fever, chills, diaphoresis, ear pain, sore throat, eye problems, cough, SOB, chest pain, abdominal pain, vomiting, diarrhea, dysuria, rash, headache, and arm or leg problems. No other associated symptoms.     The history is provided by the patient. No  was used.     Review of patient's allergies indicates:  No Known Allergies  Past Medical History:   Diagnosis Date    Coronary artery disease     Diabetes mellitus     Hyperlipidemia     Hypertension     Thyroid disease      Past Surgical History:   Procedure Laterality Date    CARDIAC SURGERY      THYROIDECTOMY       Family History   Problem Relation Age of Onset    Diabetes Mother     Heart disease Mother      Social History     Tobacco Use    Smoking status: Never Smoker    Smokeless tobacco: Never Used   Substance Use Topics    Alcohol use: No    Drug use: No     Review of Systems   Constitutional: Negative for  chills, diaphoresis and fever.        (+) Hyperglycemia   HENT: Negative for congestion, ear pain, rhinorrhea and sore throat.    Eyes: Negative for pain and visual disturbance.   Respiratory: Negative for cough and shortness of breath.    Cardiovascular: Negative for chest pain.   Gastrointestinal: Negative for abdominal pain, diarrhea, nausea and vomiting.   Genitourinary: Negative for dysuria.   Musculoskeletal: Negative for back pain, myalgias and neck pain.   Skin: Negative for rash.   Neurological: Negative for headaches.       Physical Exam     Initial Vitals [10/15/20 1433]   BP Pulse Resp Temp SpO2   (!) 149/68 91 18 98.2 °F (36.8 °C) 97 %      MAP       --         Physical Exam  The patient was examined specifically for the following:   General:No significant distress, Good color, Warm and dry. Head and neck:Scalp atraumatic, Neck supple. Neurological:Appropriate conversation, Gross motor deficits. Eyes:Conjugate gaze, Clear corneas. ENT: No epistaxis. Cardiac: Regular rate and rhythm, Grossly normal heart tones. Pulmonary: Wheezing, Rales. Gastrointestinal: Abdominal tenderness, Abdominal distention. Musculoskeletal: Extremity deformity, Apparent pain with range of motion of the joints. Skin: Rash.   The findings on examination were normal.  ED Course   Procedures  Labs Reviewed   COMPREHENSIVE METABOLIC PANEL - Abnormal; Notable for the following components:       Result Value    Sodium 134 (*)     Glucose 513 (*)     All other components within normal limits    Narrative:     GLUCOSE   critical result(s) called and verbal readback obtained from   HORACE HOLT. by LM1 10/15/2020 16:41   POCT GLUCOSE - Abnormal; Notable for the following components:    POCT Glucose 446 (*)     All other components within normal limits   POCT GLUCOSE - Abnormal; Notable for the following components:    POCT Glucose 314 (*)     All other components within normal limits   POCT GLUCOSE - Abnormal; Notable for the following  components:    POCT Glucose 274 (*)     All other components within normal limits   POCT GLUCOSE MONITORING CONTINUOUS          Imaging Results    None          Medical Decision Making:   Clinical Tests:   Lab Tests: Ordered and Reviewed   This patient presents to emergency with a history of diabetes.  She has return to the emergency room multiple times for uncontrolled blood sugars.  She is having trouble seen her primary care doctor.  She is on metformin.  I will increase her metformin to a 1000 mg twice a day.  I will go over dietary restrictions.  I will refer her to clinic.  I will have her return if she gets worse or if new problems develop.  She has received a 3 L resuscitation in the emergency room and repeat blood sugar was 274 before discharge.  There is no elevated anion gap or low serum CO2.  I doubt diabetic ketoacidosis.  The patient looks well.           Scribe Attestation:   Scribe #1: I performed the above scribed service and the documentation accurately describes the services I performed. I attest to the accuracy of the note.                      Clinical Impression:     ICD-10-CM ICD-9-CM   1. Uncontrolled type 2 diabetes mellitus with hyperglycemia  E11.65 250.02                          ED Disposition Condition    Discharge Stable        ED Prescriptions     Medication Sig Dispense Start Date End Date Auth. Provider    metFORMIN (GLUCOPHAGE) 1000 MG tablet Take 1 tablet (1,000 mg total) by mouth 2 (two) times daily. 60 tablet 10/15/2020 10/15/2021 Renan Lombardo MD        Follow-up Information     Follow up With Specialties Details Why Contact Info    [facility]  In 1 day Call in the morning for an appointment [address] [place] [address]  [phone]      Renan Lambert MD Internal Medicine In 1 day  [address]  [address]  [facility] [address]  [phone]                        I personally performed the services described in this documentation.  All medical  record  entries made by the scribe are at my direction and in my presence.  Signed, Dr. Grupo Lombardo MD  10/15/20 5023

## 2020-10-15 NOTE — DISCHARGE INSTRUCTIONS
Please follow-up with your primary care doctor, the clinic above, or the primary care doctor above this week.  Please double your metformin to 1000 mg twice a day.  I have written you a new prescription.  Please stay away from sweets, bread, pasta, potatoes, simple carbohydrates.  Your diet should consists mostly of vegetables and meat with a small amount of fruit and week crackers.  Return immediately if you get worse or if new problems develop.

## 2020-10-15 NOTE — ED NOTES
70 y.o. female to ED with c.o. dizziness, weakness, and generalized fatigue x 1 week. Patient reports she has been compliant with her medication but her blood sugar has been running high. Patient reports she took her glucose 1.5 hours prior to arrival and it was 430. Patietn denies n/v/d, denies fever/chills, denies cough/ chest pain/ shortness of breath. Patient awake, alert, and oriented x 4. No apparent distress noted. VS currently stable. Patient assisted onto stretcher and changed into a gown. Patient placed on continuous pulse oximetry and automatic blood pressure cuff. Bed placed in low locked position, side rails up x 2, call light is within reach of patient orientation to room and explanation of wait provided to patient, alarms set and turned on for monitor and pulse ox, awaiting MD evaluation and orders, will continue to monitor.

## 2020-10-15 NOTE — ED NOTES
Rounding on the patient has been done. The patient has been updated on the plan of care and current status. Pain was assessed and is currently a 0/10. Comfort positioning and restroom needs were addressed. Necessary items were placed with in reach and was advised when a reassessment would take place. The call bell remains at the bedside for any additional patient needs. Family at bedside.The patient is resting comfortably on the stretcher, respirations are even and unlabored, skin warm and dry. Will continue to monitor.

## 2022-03-29 ENCOUNTER — PATIENT OUTREACH (OUTPATIENT)
Dept: ADMINISTRATIVE | Facility: HOSPITAL | Age: 72
End: 2022-03-29
Payer: MEDICARE

## 2023-01-11 ENCOUNTER — PATIENT OUTREACH (OUTPATIENT)
Dept: ADMINISTRATIVE | Facility: HOSPITAL | Age: 73
End: 2023-01-11
Payer: MEDICARE